# Patient Record
Sex: FEMALE | URBAN - METROPOLITAN AREA
[De-identification: names, ages, dates, MRNs, and addresses within clinical notes are randomized per-mention and may not be internally consistent; named-entity substitution may affect disease eponyms.]

---

## 2023-03-08 ENCOUNTER — APPOINTMENT (OUTPATIENT)
Dept: URBAN - METROPOLITAN AREA SURGERY 9 | Age: 48
Setting detail: DERMATOLOGY
End: 2023-03-08

## 2023-03-08 DIAGNOSIS — L65.8 OTHER SPECIFIED NONSCARRING HAIR LOSS: ICD-10-CM

## 2023-03-08 DIAGNOSIS — L21.8 OTHER SEBORRHEIC DERMATITIS: ICD-10-CM

## 2023-03-08 PROCEDURE — 99204 OFFICE O/P NEW MOD 45 MIN: CPT

## 2023-03-08 PROCEDURE — OTHER PRESCRIPTION: OTHER

## 2023-03-08 PROCEDURE — OTHER COUNSELING: OTHER

## 2023-03-08 PROCEDURE — OTHER MIPS QUALITY: OTHER

## 2023-03-08 PROCEDURE — OTHER OTHER: OTHER

## 2023-03-08 PROCEDURE — OTHER PRESCRIPTION MEDICATION MANAGEMENT: OTHER

## 2023-03-08 PROCEDURE — OTHER TREATMENT REGIMEN: OTHER

## 2023-03-08 RX ORDER — FLUOCINONIDE 0.5 MG/ML
SOLUTION TOPICAL
Qty: 60 | Refills: 2 | Status: ERX | COMMUNITY
Start: 2023-03-08

## 2023-03-08 RX ORDER — KETOCONAZOLE 20 MG/ML
SHAMPOO, SUSPENSION TOPICAL
Qty: 120 | Refills: 3 | Status: ERX | COMMUNITY
Start: 2023-03-08

## 2023-03-08 ASSESSMENT — LOCATION DETAILED DESCRIPTION DERM
LOCATION DETAILED: LEFT SUPERIOR PARIETAL SCALP
LOCATION DETAILED: RIGHT SUPERIOR PARIETAL SCALP

## 2023-03-08 ASSESSMENT — LOCATION SIMPLE DESCRIPTION DERM: LOCATION SIMPLE: SCALP

## 2023-03-08 ASSESSMENT — LOCATION ZONE DERM: LOCATION ZONE: SCALP

## 2023-03-08 NOTE — HPI: HAIR LOSS
Previous Labs: Yes
How Did The Hair Loss Occur?: sudden in onset
How Severe Is Your Hair Loss?: moderate
What Hair Products Do You Use?: Suri unbreakable length
Additional History: Per patient hair loss has been going on for a year but last 6 months things have gotten extremely worse. Patient states hair is falling out a lot, and tender at the hair roots. She has tried otc hair vitamins and other products to help but no improvement. Had labs completed about a week ago at pcp for thyroid and anemia all normally per patient. She also has seen allergy recently due to sensitive itchy skin to see if it could be reactions to shampoo, no reactions with chemicals.

## 2023-03-08 NOTE — PROCEDURE: MIPS QUALITY
Counseling and Referral of Other Preventative  (Italic type indicates deductible and co-insurance are waived)    Patient Name: Katherine Rivers  Today's Date: 3/11/2022    Health Maintenance       Date Due Completion Date    Lipid Panel 08/06/2022 8/6/2021    Hemoglobin A1c 08/15/2022 2/15/2022    Eye Exam 09/29/2022 9/29/2021    Override on 5/17/2017: Done    Override on 6/13/2016: Done (hypermetropia and presbyopia)    Override on 5/7/2015: Done (Dr. Swann)    Mammogram 10/27/2022 10/27/2021    Diabetes Urine Screening 02/15/2023 2/15/2022    Foot Exam 02/22/2023 2/22/2022    Override on 12/17/2014: Done    Low Dose Statin 03/10/2023 3/10/2022    DEXA Scan 03/03/2025 3/3/2022    Colorectal Cancer Screening 03/08/2026 10/9/2018    Override on 1/5/2006: Done (Done by Dr Richard Mcmahon at [facility])    TETANUS VACCINE 10/25/2031 10/25/2021        No orders of the defined types were placed in this encounter.    The following information is provided to all patients.  This information is to help you find resources for any of the problems found today that may be affecting your health:                Living healthy guide: www.[facility].[place].gov      Understanding Diabetes: www.diabetes.org      Eating healthy: www.cdc.gov/healthyweight      CDC home safety checklist: www.cdc.gov/steadi/patient.html      Agency on Aging: www.goea.[place].gov      Alcoholics anonymous (AA): www.aa.org      Physical Activity: www.izabela.nih.gov/ei6ycfc      Tobacco use: www.quitwithusla.org     
Quality 110: Preventive Care And Screening: Influenza Immunization: Influenza immunization was not ordered or administered, reason not given
Quality 226: Preventive Care And Screening: Tobacco Use: Screening And Cessation Intervention: Patient screened for tobacco use and is an ex/non-smoker
Detail Level: Detailed

## 2023-03-08 NOTE — PROCEDURE: PRESCRIPTION MEDICATION MANAGEMENT
Render In Strict Bullet Format?: No
Detail Level: Simple
Initiate Treatment: Ketoconazole shampoo wash AAs of the scalp once daily x 1 week, then can decrease to 3 times per week as maintenance. Leave on for 5 mins before washing\\n\\nfluocinonide solution- apply to affected itchy areas on scalp twice daily for up to 2 weeks per month, prn flares. Never to face or groin.

## 2023-03-08 NOTE — PROCEDURE: OTHER
Detail Level: Detailed
Render Risk Assessment In Note?: no
Other (Free Text): Patient will have pcp fax over recent labs for review
Note Text (......Xxx Chief Complaint.): This diagnosis correlates with the